# Patient Record
Sex: FEMALE | Race: WHITE | NOT HISPANIC OR LATINO | URBAN - NONMETROPOLITAN AREA
[De-identification: names, ages, dates, MRNs, and addresses within clinical notes are randomized per-mention and may not be internally consistent; named-entity substitution may affect disease eponyms.]

---

## 2017-06-29 ENCOUNTER — APPOINTMENT (RX ONLY)
Dept: URBAN - NONMETROPOLITAN AREA CLINIC 22 | Facility: CLINIC | Age: 42
Setting detail: DERMATOLOGY
End: 2017-06-29

## 2017-06-29 DIAGNOSIS — Z41.9 ENCOUNTER FOR PROCEDURE FOR PURPOSES OTHER THAN REMEDYING HEALTH STATE, UNSPECIFIED: ICD-10-CM

## 2017-06-29 PROCEDURE — ? PRODUCT LINE (ANTI-AGING)

## 2017-06-29 PROCEDURE — ? HYDRAFACIAL

## 2017-06-29 PROCEDURE — 99211 OFF/OP EST MAY X REQ PHY/QHP: CPT

## 2017-06-29 PROCEDURE — ? PRODUCT LINE (MOISTURIZERS)

## 2017-06-29 ASSESSMENT — LOCATION ZONE DERM: LOCATION ZONE: FACE

## 2017-06-29 ASSESSMENT — LOCATION SIMPLE DESCRIPTION DERM: LOCATION SIMPLE: LEFT FOREHEAD

## 2017-06-29 ASSESSMENT — LOCATION DETAILED DESCRIPTION DERM: LOCATION DETAILED: LEFT INFERIOR MEDIAL FOREHEAD

## 2017-06-29 NOTE — PROCEDURE: PRODUCT LINE (ANTI-AGING)
Product 4 Application Directions: $82.00 + $4.92= $86.92\\n\\nApply 3 drops of CE Ferulic in the AM.\\n\\nPt received employee discount on products today. Daughter-in-law of Dr. Tolentino.

## 2017-06-29 NOTE — PROCEDURE: HYDRAFACIAL
Indication: skin texture
Glycolic Acid %: 15%
Number Of Passes: 1
Vacuum Pressure: 17
Post-Care Instructions: I reviewed with the patient in detail post-care instructions. Patient should stay away from the sun and wear sun protection until treated areas are fully healed.
Additional Vacuum Pressure (Won't Render If 0): 0
Detail Level: Zone
Consent: Prior to treatment, risks were reviewed including but not limited to crusting, scabbing, blistering, scarring, darker or lighter pigmentary change, bruising, and/or incomplete response.
Price (Use Numbers Only, No Special Characters Or $): 75.00
Comments: Pt tolerated procedure without any complications.
Treatment Number: 4

## 2017-06-29 NOTE — PROCEDURE: PRODUCT LINE (ANTI-AGING)
Product 2 Price (In Dollars - Numeric Only, No Special Characters Or $): 153.69 Product 2 Price (In Dollars - Numeric Only, No Special Characters Or $): 153.30

## 2017-06-29 NOTE — PROCEDURE: PRODUCT LINE (ANTI-AGING)
Product 16 Price (In Dollars - Numeric Only, No Special Characters Or $): 100.21 Product 16 Price (In Dollars - Numeric Only, No Special Characters Or $): 100.00

## 2017-06-29 NOTE — PROCEDURE: PRODUCT LINE (ANTI-AGING)
Product 14 Application Directions: $155.00 + $9.30 + $164.30\\n\\nPt also received a free sunscreen with the Phloretin CF- [employer] Physical Fusion UV Defense\\n\\n

## 2017-06-29 NOTE — PROCEDURE: PRODUCT LINE (MOISTURIZERS)
Product 49 Units: 0
Product 75 Price (In Dollars - Numeric Only, No Special Characters Or $): 0.00
Product 11 Application Directions: $30.00 + $1.80= $31.80
Product 11 Price (In Dollars - Numeric Only, No Special Characters Or $): 31.80
Product 13 Price (In Dollars - Numeric Only, No Special Characters Or $): 36.04
Product 7 Price (In Dollars - Numeric Only, No Special Characters Or $): 15.90
Product 10 Application Directions: $38.00 + $2.28 = $40.28
Detail Level: Zone
Product 14 Price (In Dollars - Numeric Only, No Special Characters Or $): 188.60
Product 2 Price (In Dollars - Numeric Only, No Special Characters Or $): 5.30
Product 14 Application Directions: $178.00 + $10.60 = $188.60
Product 12 Application Directions: $15.00 + $0.90 = $15.90
Product 8 Application Directions: $11.00 + $0.66 = $11.66
Product 4 Price (In Dollars - Numeric Only, No Special Characters Or $): 10.60
Send Charges To Patient Encounter: Yes
Name Of Product 6: SkinCeuticals Triple Lipid Restore
Name Of Product 3: Cera Ve SA
Name Of Product 8: CeraVe Hydrating Cleanser
Product 6 Price (In Dollars - Numeric Only, No Special Characters Or $): 67.31
Product 7 Application Directions: $15.00 + $.90 = $15.90\\n
Name Of Product 1: Cera Ve Moisturizing Cream
Product 15 Application Directions: $127.00 + $7.62 = $134.62
Product 8 Price (In Dollars - Numeric Only, No Special Characters Or $): 11.66
Name Of Product 14: SkinMedica HA5
Name Of Product 11: LipSmart
Product 3 Price (In Dollars - Numeric Only, No Special Characters Or $): 12.72
Product 9 Price (In Dollars - Numeric Only, No Special Characters Or $): 65.72
Product 6 Application Directions: $63.50 + $3.81 = $67.31
Name Of Product 9: SkinCeiticals Daily Moisture
Product 9 Application Directions: $62.00 + $3.72 = $65.72
Name Of Product 5: Cera Ve AM Face Lotion
Product 4 Application Directions: $10.00 + $0.60 = $10.60
Product 5 Application Directions: $10.00 + $.60= $10.60
Name Of Product 4: Cera Ve PM Face Lotion
Name Of Product 7: CeraVe Body
Name Of Product 10: SkinCeuticals LHA Cleanser
Product 15 Price (In Dollars - Numeric Only, No Special Characters Or $): 134.62
Product 2 Application Directions: $5.00 + $.30 = $5.30\\n\\nHand cream was part of a package for a [place] Special bundled with Cera Ve moisturizing cream.
Product 13 Application Directions: $34.00 + $2.04 =$36.04
Name Of Product 13: SkinCeiticals Clarifying Cleanser
Product 6 Units: 1
Name Of Product 2: Cera Ve Hand Cream
Product 3 Application Directions: $12.00 + $0.72 = $12.72 *2= $25.44
Name Of Product 12: CeraVe Itch Relief Cream
Product 1 Application Directions: $10.00 + $.60 = $10.60\\n\\n
Product 10 Price (In Dollars - Numeric Only, No Special Characters Or $): 40.28

## 2017-08-02 ENCOUNTER — RX ONLY (OUTPATIENT)
Age: 42
Setting detail: RX ONLY
End: 2017-08-02

## 2017-08-02 RX ORDER — SULFAMETHOXAZOLE AND TRIMETHOPRIM 800; 160 MG/1; MG/1
TABLET ORAL
Qty: 14 | Refills: 0 | Status: ERX | COMMUNITY
Start: 2017-08-02

## 2017-08-02 RX ORDER — SULFAMETHOXAZOLE AND TRIMETHOPRIM 800; 160 MG/1; MG/1
TABLET ORAL
Qty: 14 | Refills: 0 | Status: ERX

## 2017-12-27 ENCOUNTER — APPOINTMENT (RX ONLY)
Dept: URBAN - NONMETROPOLITAN AREA CLINIC 22 | Facility: CLINIC | Age: 42
Setting detail: DERMATOLOGY
End: 2017-12-27

## 2017-12-27 DIAGNOSIS — Z41.9 ENCOUNTER FOR PROCEDURE FOR PURPOSES OTHER THAN REMEDYING HEALTH STATE, UNSPECIFIED: ICD-10-CM

## 2017-12-27 PROCEDURE — ? HYDRAFACIAL

## 2017-12-27 ASSESSMENT — LOCATION SIMPLE DESCRIPTION DERM: LOCATION SIMPLE: INFERIOR FOREHEAD

## 2017-12-27 ASSESSMENT — LOCATION DETAILED DESCRIPTION DERM: LOCATION DETAILED: INFERIOR MID FOREHEAD

## 2017-12-27 ASSESSMENT — LOCATION ZONE DERM: LOCATION ZONE: FACE

## 2017-12-27 NOTE — PROCEDURE: HYDRAFACIAL
Consent: Prior to treatment, risks were reviewed including but not limited to crusting, scabbing, blistering, scarring, darker or lighter pigmentary change, bruising, and/or incomplete response.
Treatment Number: 1
Post-Care Instructions: I reviewed with the patient in detail post-care instructions. Patient should stay away from the sun and wear sun protection until treated areas are fully healed.
Additional Vacuum Pressure (Won't Render If 0): 0
Vacuum Pressure: 19
Detail Level: Zone
Comments: Pt tolerated procedure without any complications. \\n\\nSSV Daughter-in-law.
Indication: anti-aging
Glycolic Acid %: 7.5%

## 2018-08-09 ENCOUNTER — APPOINTMENT (RX ONLY)
Dept: URBAN - NONMETROPOLITAN AREA CLINIC 22 | Facility: CLINIC | Age: 43
Setting detail: DERMATOLOGY
End: 2018-08-09

## 2018-08-09 DIAGNOSIS — Z41.9 ENCOUNTER FOR PROCEDURE FOR PURPOSES OTHER THAN REMEDYING HEALTH STATE, UNSPECIFIED: ICD-10-CM

## 2018-08-09 PROCEDURE — ? HYDRAFACIAL

## 2018-08-09 ASSESSMENT — LOCATION DETAILED DESCRIPTION DERM
LOCATION DETAILED: LEFT CHIN
LOCATION DETAILED: NASAL SUPRATIP
LOCATION DETAILED: LEFT INFERIOR CENTRAL MALAR CHEEK
LOCATION DETAILED: RIGHT INFERIOR CENTRAL MALAR CHEEK
LOCATION DETAILED: INFERIOR MID FOREHEAD

## 2018-08-09 ASSESSMENT — LOCATION SIMPLE DESCRIPTION DERM
LOCATION SIMPLE: NOSE
LOCATION SIMPLE: CHIN
LOCATION SIMPLE: RIGHT CHEEK
LOCATION SIMPLE: INFERIOR FOREHEAD
LOCATION SIMPLE: LEFT CHEEK

## 2018-08-09 ASSESSMENT — LOCATION ZONE DERM
LOCATION ZONE: NOSE
LOCATION ZONE: FACE

## 2018-08-09 NOTE — PROCEDURE: HYDRAFACIAL
Indication: dehydrated skin
Post-Care Instructions: I reviewed with the patient in detail post-care instructions. Patient should stay away from the sun and wear sun protection until treated areas are fully healed. Pt tolerated procedure well without complications and was mildly pink upon departure.
Detail Level: Zone
Treatment Number: 1
Additional Vacuum Pressure (Won't Render If 0): 22
Vacuum Pressure: 16
Consent: Written consent obtained, risks reviewed including but not limited to crusting, scabbing, blistering, scarring, darker or lighter pigmentary change, bruising, and/or incomplete response.
Price (Use Numbers Only, No Special Characters Or $): 75
Comments: Pt tolerated procedure well. Applied R|E Ultra Hydrating Serum and R|E Triple AOX Serum with R|E Mineral Sunscreen overtop.
Glycolic Acid %: 7.5%
Additional Vacuum Pressure (Won't Render If 0): 12

## 2019-04-17 NOTE — PROCEDURE: PRODUCT LINE (ANTI-AGING)
What Type Of Note Output Would You Prefer (Optional)?: Bullet Format How Severe Is Your Rash?: mild Is This A New Presentation, Or A Follow-Up?: Rash Name Of Product 27: Glytone Mild Creamy Wash